# Patient Record
Sex: FEMALE | Race: OTHER | HISPANIC OR LATINO | URBAN - METROPOLITAN AREA
[De-identification: names, ages, dates, MRNs, and addresses within clinical notes are randomized per-mention and may not be internally consistent; named-entity substitution may affect disease eponyms.]

---

## 2020-03-06 ENCOUNTER — EMERGENCY (EMERGENCY)
Facility: HOSPITAL | Age: 50
LOS: 1 days | Discharge: HOME | End: 2020-03-06
Attending: EMERGENCY MEDICINE | Admitting: EMERGENCY MEDICINE
Payer: MEDICARE

## 2020-03-06 VITALS
SYSTOLIC BLOOD PRESSURE: 138 MMHG | RESPIRATION RATE: 16 BRPM | OXYGEN SATURATION: 99 % | DIASTOLIC BLOOD PRESSURE: 76 MMHG | HEART RATE: 78 BPM

## 2020-03-06 VITALS
RESPIRATION RATE: 16 BRPM | HEART RATE: 82 BPM | OXYGEN SATURATION: 99 % | TEMPERATURE: 98 F | DIASTOLIC BLOOD PRESSURE: 90 MMHG | SYSTOLIC BLOOD PRESSURE: 182 MMHG

## 2020-03-06 PROCEDURE — 99283 EMERGENCY DEPT VISIT LOW MDM: CPT

## 2020-03-06 RX ORDER — ARIPIPRAZOLE 15 MG/1
10 TABLET ORAL ONCE
Refills: 0 | Status: COMPLETED | OUTPATIENT
Start: 2020-03-06 | End: 2020-03-06

## 2020-03-06 RX ORDER — DIPHENHYDRAMINE HCL 50 MG
25 CAPSULE ORAL ONCE
Refills: 0 | Status: COMPLETED | OUTPATIENT
Start: 2020-03-06 | End: 2020-03-06

## 2020-03-06 RX ADMIN — Medication 25 MILLIGRAM(S): at 14:00

## 2020-03-06 RX ADMIN — ARIPIPRAZOLE 10 MILLIGRAM(S): 15 TABLET ORAL at 14:18

## 2020-03-06 NOTE — ED PROVIDER NOTE - PROGRESS NOTE DETAILS
Pt. now verbal, appropriate, exhibits O x 3, ambulating easily, denies hallucinations, conversant in both English and Swedish. Pt. feels well enough to dc with parent from ED. Pt. and parent comfortable with taking taxi home. Mother states that this behavior was typical of her daughter when missing her meds and that she is now back to baseline.

## 2020-03-06 NOTE — ED PROVIDER NOTE - NSFOLLOWUPINSTRUCTIONS_ED_ALL_ED_FT
You received abilify by mouth in the Emergency Dept. after missing a dose last night.  You should continue your usual medications as prescribed.  Follow up with your doctor or psychiatrist or return for worsening pain, fever, weakness, numbness, hallucinations, thoughts of harming self or others or any signs of distress.

## 2020-03-06 NOTE — ED ADULT TRIAGE NOTE - CHIEF COMPLAINT QUOTE
Missed medications, pt with history of Bipolar. Missed medications, pt with history of schizophrenia .

## 2020-03-06 NOTE — ED PROVIDER NOTE - CLINICAL SUMMARY MEDICAL DECISION MAKING FREE TEXT BOX
49F psych hx, schizophrenia, missed abilify dose last night, now inc. abnl behavior, per parent typical of when pt. missed meds. Reassess after abilify. Poss. psych eval.

## 2020-03-06 NOTE — ED PROVIDER NOTE - OBJECTIVE STATEMENT
49F hx schizophrenia, DM, HTN, takes Abilify injections as well as tablets, typically takes a bedtime pill but last night was accompanying mother in CDU and stayed overnight. Pt.'s mother gives her meds but was unable to last night since was in obs status in ED. This a.m. pt's mother taken for stress test to eval. for CP and while parent away, pt. began to wander into other pt's rooms, appeared to hallucinate, staring up and pointing. Parent returned to room and pt. improved but cont. to be less verbal, more disorganized and not obeying commands. Pt. then triaged and taken to  area for full eval. Mother states that no hx of recent illness, no fever/chills, no unusual ingestions.

## 2020-03-06 NOTE — ED ADULT NURSE NOTE - OBJECTIVE STATEMENT
50 y/o female was visiting with her mother who is currently in CDU, pt started having bizarre behavior.  Pt's mother is her caregiver and pt has missed doses of her medication due to her caregiver's hospitalization.  Pt awake alert marching in place and walking in circles, pt easily redirected, appears to be looking up in the yelitza.  Pt does not appear to be responding to internal stimuli, not agitated or combative, but requires frequent redirection.

## 2020-03-06 NOTE — ED PROVIDER NOTE - PATIENT PORTAL LINK FT
You can access the FollowMyHealth Patient Portal offered by [facility] by registering at the following website: [email or web address]. By joining [facility]’s FollowMyHealth portal, you will also be able to view your health information using other applications (apps) compatible with our system.

## 2022-12-29 NOTE — ED ADULT NURSE NOTE - CAS DISCH BELONGINGS RETURNED
-- DO NOT REPLY / DO NOT REPLY ALL --  -- Message is from [facility] [employer] ([facility]) --    Simeon Rodriguez would like a call to follow-up on the form he faxed over.                
Form has been filled out and faxed back.  
Not applicable

## 2023-02-27 PROBLEM — F20.9 SCHIZOPHRENIA, UNSPECIFIED: Chronic | Status: ACTIVE | Noted: 2020-03-06

## 2023-03-02 ENCOUNTER — APPOINTMENT (OUTPATIENT)
Dept: FAMILY MEDICINE | Facility: CLINIC | Age: 53
End: 2023-03-02